# Patient Record
Sex: MALE | Race: WHITE
[De-identification: names, ages, dates, MRNs, and addresses within clinical notes are randomized per-mention and may not be internally consistent; named-entity substitution may affect disease eponyms.]

---

## 2017-12-22 NOTE — OP
DATE OF SURGERY:  12/21/2017

 

PREOPERATIVE DIAGNOSIS:  Septoplasty.

 

PROCEDURE PERFORMED:

1.  Bilateral nasal endoscopy with submucosal resection of inferior turbinates:

2.  Bilateral nasal endoscopy with total ethmoidectomy.

3.  Bilateral nasal endoscopy with maxillary antrostomy with removal of tissue.

4.  Bilateral nasal endoscopy with frontal sinusotomy.

5.  Bilateral nasal endoscopy with sphenoidotomy.

 

PROCEDURE IN DETAIL:  Endoscopy with submucosal resection of inferior turbinates:  After consent was 
obtained, the patient was identified, brought to the operating room, and placed on the operating room
 table in the supine position.  Consent was obtained, notifying the patient of the possibility of add
itional infections, bleeding, brain injury, and eye/orbital injury.   The patient was placed on the o
perating room table, and general endotracheal anesthesia and intravenous access was obtained.  The pa
tient was then positioned, prepped and draped for endoscopic sinus surgery.  Nasal preparation includ
ed trimming nasal vestibular hairs and spraying in topical Afrin.  We then placed Afrin topical solut
ion on nasal pledgets and strategically located them intranasally.  The perinasal mucosa was injected
 with 1% lidocaine with 1:100,000 epinephrine in the submucoperichondrial plane of the septum, latera
l nasal wall, and anterior to the uncinate.  The patient was then prepped and draped in a sterile fas
hion and positioned for endoscopic sinus surgery.

 

The inferior turbinates were visualized under endoscopic visualization and outfractured with the elev
ator.  The inferolateral edge of the inferior turbinate was then cauterized along its length with the
 suction cautery without difficulty.

 

The inferior turbinates were visualized with a 0-degree endoscope and outfractured with a [place] eleva
tor.  The inferior medial aspect was cauterized with the electrocautery.  Hemostasis was obtained .  
After adequate airway was established, we turned our attention to the contralateral side and used a s
imilar procedure.  Again, a [place] elevator was used to outfracture inferior turbinates under endoscop
ic visualization.  With a suction cautery, the free inferior medial aspect was cauterized under direc
t visualization along the length of the inferior turbinate.

 

Total Ethmoidectomy:  The anterior face of the ethmoid bulla was entered and with the micro-debrider,
 dissection continued posteriorly to the ground lamella.  The limits of dissection included the inser
tion of the middle turbinate, medial orbital wall, and base of skull.  We similarly identified the fr
ontal recess and removed shrouds of bone and debris in that region to obtain patency into the agger n
asi region and frontal recess.  We then entered the ground lamella and its anteroinferior aspect and 
proceeded posteriorly, opening the posterior ethmoid air-cell system.  Again, the limits of dissectio
n included the base of skull and medial orbital wall.

 

Maxillary Antrostomy:  The uncinate was then identified and the extent of the uncinate was appreciate
d by out-fracturing the uncinate with the ball-tip probe.  We then used the sickle blade to disarticu
late the uncinate from the lateral nasal wall.  This was then removed with straight biting and upbiti
ng punches with the remaining shrouds of mucosa and bony septum removed with the micro-debrider.  The
 natural os of the maxillary sinus was then identified and enlarged with the maxillary punches and ba
ck biting forceps.

 

Endoscopic Sinus Surgery:  With the 0-degree endoscope, the patient underwent systematic nasal endosc
opy.  There were no suspicious internasal masses or lesions identified.  We then focused our attentio
n to the osteomeatal complex region under the middle turbinate.

 

Sphenoidotomy:  The anterior face of the sphenoid was identified and entered in its extreme anteroinf
erior aspect.  A sphenoid punch was then used to enlarge the sphenoidotomy and no injury to the optic
 nerve or internal carotid artery occurred.

 

At this point, we then turned our attention to the contralateral side and proceeded with endoscopic s
inus surgery.

 

At the completion of the case, [place] keel splints were placed in the ethmoid cavities after the ethmoi
dectomy.  There were no complications.  The patient tolerated the procedure well and was discharged t
o the recovery room in stable condition prior to return to the preoperative Day Stay with [facility].  Prescriptions for pain medication and antibiotics were provided.  The patient received
 intramuscular Depo-Medrol during the case.

 

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 
and purulence in all sinuses.

## 2022-07-28 ENCOUNTER — HOSPITAL ENCOUNTER (OUTPATIENT)
Dept: HOSPITAL 92 - SDC | Age: 77
Discharge: HOME | End: 2022-07-28
Attending: OTOLARYNGOLOGY
Payer: MEDICARE

## 2022-07-28 VITALS — BODY MASS INDEX: 34.7 KG/M2

## 2022-07-28 DIAGNOSIS — Z79.82: ICD-10-CM

## 2022-07-28 DIAGNOSIS — C44.212: Primary | ICD-10-CM

## 2022-07-28 DIAGNOSIS — Z95.5: ICD-10-CM

## 2022-07-28 DIAGNOSIS — Z79.2: ICD-10-CM

## 2022-07-28 DIAGNOSIS — Z79.02: ICD-10-CM

## 2022-07-28 DIAGNOSIS — I10: ICD-10-CM

## 2022-07-28 DIAGNOSIS — Z79.899: ICD-10-CM

## 2022-07-28 DIAGNOSIS — Z79.84: ICD-10-CM

## 2022-07-28 LAB
ANION GAP SERPL CALC-SCNC: 18 MMOL/L (ref 10–20)
BUN SERPL-MCNC: 45 MG/DL (ref 8.4–25.7)
CALCIUM SERPL-MCNC: 9.2 MG/DL (ref 7.8–10.44)
CHLORIDE SERPL-SCNC: 105 MMOL/L (ref 98–107)
CO2 SERPL-SCNC: 19 MMOL/L (ref 23–31)
CREAT CL PREDICTED SERPL C-G-VRATE: 30 ML/MIN (ref 70–130)
GLUCOSE SERPL-MCNC: 153 MG/DL (ref 83–110)
HGB BLD-MCNC: 10.5 G/DL (ref 14–18)
POTASSIUM SERPL-SCNC: 4.8 MMOL/L (ref 3.5–5.1)
POTASSIUM SERPL-SCNC: 5.6 MMOL/L (ref 3.5–5.1)
SODIUM SERPL-SCNC: 136 MMOL/L (ref 136–145)

## 2022-07-28 PROCEDURE — 93005 ELECTROCARDIOGRAM TRACING: CPT

## 2022-07-28 PROCEDURE — 93010 ELECTROCARDIOGRAM REPORT: CPT

## 2022-07-28 PROCEDURE — 88332 PATH CONSLTJ SURG EA ADD BLK: CPT

## 2022-07-28 PROCEDURE — 85014 HEMATOCRIT: CPT

## 2022-07-28 PROCEDURE — 85018 HEMOGLOBIN: CPT

## 2022-07-28 PROCEDURE — 88305 TISSUE EXAM BY PATHOLOGIST: CPT

## 2022-07-28 PROCEDURE — 88331 PATH CONSLTJ SURG 1 BLK 1SPC: CPT

## 2022-07-28 PROCEDURE — 0HR2X74 REPLACEMENT OF RIGHT EAR SKIN WITH AUTOLOGOUS TISSUE SUBSTITUTE, PARTIAL THICKNESS, EXTERNAL APPROACH: ICD-10-PCS | Performed by: OTOLARYNGOLOGY

## 2022-07-28 PROCEDURE — 80048 BASIC METABOLIC PNL TOTAL CA: CPT
